# Patient Record
Sex: MALE | Employment: UNEMPLOYED | URBAN - METROPOLITAN AREA
[De-identification: names, ages, dates, MRNs, and addresses within clinical notes are randomized per-mention and may not be internally consistent; named-entity substitution may affect disease eponyms.]

---

## 2017-05-05 ENCOUNTER — TELEPHONE (OUTPATIENT)
Dept: ORTHOPEDICS | Facility: CLINIC | Age: 52
End: 2017-05-05

## 2017-05-05 NOTE — TELEPHONE ENCOUNTER
----- Message from Jenniffer Boudreaux sent at 5/5/2017 11:56 AM CDT -----  Contact: Nurse # [phone]  I scheduled pt with np appt on May 12  [facility] Nurse # [phone] will fax pt records prior to appt, [facility]  thanks

## 2017-05-11 DIAGNOSIS — M25.552 LEFT HIP PAIN: Primary | ICD-10-CM

## 2017-05-12 ENCOUNTER — TELEPHONE (OUTPATIENT)
Dept: ORTHOPEDICS | Facility: CLINIC | Age: 52
End: 2017-05-12

## 2017-05-12 NOTE — TELEPHONE ENCOUNTER
----- Message from Bridgett Aguila sent at 5/12/2017  8:37 AM CDT -----  Contact: Rosa From [facility]   Called placed to pod Rosa from [facility] called and states they may be late or may need to reschedule they have not been able to reach the patient for his appointment today.